# Patient Record
Sex: MALE | Race: WHITE
[De-identification: names, ages, dates, MRNs, and addresses within clinical notes are randomized per-mention and may not be internally consistent; named-entity substitution may affect disease eponyms.]

---

## 2020-12-20 ENCOUNTER — HOSPITAL ENCOUNTER (EMERGENCY)
Dept: HOSPITAL 33 - ED | Age: 50
Discharge: HOME | End: 2020-12-20
Payer: COMMERCIAL

## 2020-12-20 VITALS — SYSTOLIC BLOOD PRESSURE: 170 MMHG | DIASTOLIC BLOOD PRESSURE: 94 MMHG | HEART RATE: 69 BPM | OXYGEN SATURATION: 96 %

## 2020-12-20 DIAGNOSIS — R00.2: Primary | ICD-10-CM

## 2020-12-20 DIAGNOSIS — R06.02: ICD-10-CM

## 2020-12-20 DIAGNOSIS — U07.1: ICD-10-CM

## 2020-12-20 LAB
ALBUMIN SERPL-MCNC: 4.5 G/DL (ref 3.5–5)
ALP SERPL-CCNC: 64 U/L (ref 38–126)
ALT SERPL-CCNC: 23 U/L (ref 0–50)
ANION GAP SERPL CALC-SCNC: 13.7 MEQ/L (ref 5–15)
AST SERPL QL: 44 U/L (ref 17–59)
BASOPHILS # BLD AUTO: 0.03 10*3/UL (ref 0–0.4)
BASOPHILS NFR BLD AUTO: 0.4 % (ref 0–0.4)
BILIRUB BLD-MCNC: 0.6 MG/DL (ref 0.2–1.3)
BNP SERPL-MCNC: 50 PG/ML (ref 0–450)
BUN SERPL-MCNC: 22 MG/DL (ref 9–20)
CALCIUM SPEC-MCNC: 8.9 MG/DL (ref 8.4–10.2)
CHLORIDE SERPL-SCNC: 102 MMOL/L (ref 98–107)
CO2 SERPL-SCNC: 22 MMOL/L (ref 22–30)
CREAT SERPL-MCNC: 1.04 MG/DL (ref 0.66–1.25)
EOSINOPHIL # BLD AUTO: 0.12 10*3/UL (ref 0–0.5)
GFR SERPLBLD BASED ON 1.73 SQ M-ARVRAT: > 60 ML/MIN
GLUCOSE SERPL-MCNC: 134 MG/DL (ref 74–106)
HCT VFR BLD AUTO: 47.7 % (ref 42–50)
HGB BLD-MCNC: 15.9 GM/DL (ref 12.5–18)
LYMPHOCYTES # SPEC AUTO: 2.77 10*3/UL (ref 1–4.6)
MAGNESIUM SERPL-MCNC: 2.1 MG/DL (ref 1.6–2.3)
MCH RBC QN AUTO: 32.1 PG (ref 26–32)
MCHC RBC AUTO-ENTMCNC: 33.3 G/DL (ref 32–36)
MONOCYTES # BLD AUTO: 0.84 10*3/UL (ref 0–1.3)
PLATELET # BLD AUTO: 221 K/MM3 (ref 150–450)
POTASSIUM SERPLBLD-SCNC: 3.7 MMOL/L (ref 3.5–5.1)
PROT SERPL-MCNC: 7.7 G/DL (ref 6.3–8.2)
RBC # BLD AUTO: 4.95 M/MM3 (ref 4.1–5.6)
SODIUM SERPL-SCNC: 135 MMOL/L (ref 137–145)
TROPONIN T SERPL HS-MCNC: < 0.012 NG/ML (ref 0–0.03)
WBC # BLD AUTO: 7.5 K/MM3 (ref 4–10.5)

## 2020-12-20 PROCEDURE — 84484 ASSAY OF TROPONIN QUANT: CPT

## 2020-12-20 PROCEDURE — 96360 HYDRATION IV INFUSION INIT: CPT

## 2020-12-20 PROCEDURE — 71046 X-RAY EXAM CHEST 2 VIEWS: CPT

## 2020-12-20 PROCEDURE — 85025 COMPLETE CBC W/AUTO DIFF WBC: CPT

## 2020-12-20 PROCEDURE — 80053 COMPREHEN METABOLIC PANEL: CPT

## 2020-12-20 PROCEDURE — 85379 FIBRIN DEGRADATION QUANT: CPT

## 2020-12-20 PROCEDURE — 36415 COLL VENOUS BLD VENIPUNCTURE: CPT

## 2020-12-20 PROCEDURE — 83735 ASSAY OF MAGNESIUM: CPT

## 2020-12-20 PROCEDURE — 99284 EMERGENCY DEPT VISIT MOD MDM: CPT

## 2020-12-20 PROCEDURE — 83880 ASSAY OF NATRIURETIC PEPTIDE: CPT

## 2020-12-20 PROCEDURE — 99291 CRITICAL CARE FIRST HOUR: CPT

## 2020-12-20 PROCEDURE — 93041 RHYTHM ECG TRACING: CPT

## 2020-12-20 PROCEDURE — 36000 PLACE NEEDLE IN VEIN: CPT

## 2020-12-20 PROCEDURE — 93005 ELECTROCARDIOGRAM TRACING: CPT

## 2020-12-20 NOTE — ERPHSYRPT
- History of Present Illness


Time Seen by Provider: 12/20/20 09:50


Patient Subjective Stated Complaint: SOB


Triage Nursing Assessment: Patient ambulated back to ED and transferred self to 

bed. Patient A+ O X 3. Patient's skin pink, warm and dry. Patient states around 

0730 he had started to eat breakfast when he came SOB and felt like his pulse 

stopped, but then started again. Patient stated prior he had consumed 2 glasses 

of coffee prior to this incident. Patient currently in no distress. Patient 

denies pain or disomfort. Lungs clear a/p letty.


Physician History: 





10-year-old male came to the emergency room with complaining of sudden onset of 

shortness of breath and palpitation which lasted for few seconds to few minutes.

 At around 7:30 in the morning patient drank 2 cups of coffee and afterwards 

patient started having palpitation followed by sudden missing of the heart beat 

and associated with shortness of breath.  Symptoms lasted for few minutes and 

then went away.  Patient was positive for coronavirus 2019 approximately 4 to 6 

weeks ago.  Patient had a symptoms for 2 weeks but for last 2 weeks patient has 

been feeling better.  Patient did lose sense of smell and had a fever for 1 week

and shortness of breath.


Timing/Duration: today


Severity: moderate


Associated Symptoms: denies symptoms


Allergies/Adverse Reactions: 








acetaminophen [From Vicodin] Allergy (Verified 12/20/20 08:00)


   


aspirin Allergy (Verified 12/20/20 08:00)


   


hydrocodone [From Vicodin] Allergy (Verified 12/20/20 08:00)


   


Penicillins Allergy (Verified 12/20/20 08:00)


   


Sulfa (Sulfonamide Antibiotics) Allergy (Verified 12/20/20 08:00)


   





Hx Tetanus, Diphtheria Vaccination/Date Given: No


Hx Influenza Vaccination/Date Given: No


Hx Pneumococcal Vaccination/Date Given: No


Immunizations Up to Date: Yes





Travel Risk





- International Travel


Have you traveled outside of the country in past 3 weeks: No





- Coronavirus Screening


Are you exhibiting any of the following symptoms?: No





- Review of Systems


Constitutional: No Fever, No Chills


Eyes: No Symptoms


Ears, Nose, & Throat: No Symptoms


Respiratory: No Cough, No Dyspnea


Cardiac: No Chest Pain, No Edema, No Syncope


Abdominal/Gastrointestinal: No Abdominal Pain, No Nausea, No Vomiting, No 

Diarrhea


Genitourinary Symptoms: No Dysuria


Musculoskeletal: No Back Pain, No Neck Pain


Skin: No Rash


Neurological: No Dizziness, No Focal Weakness, No Sensory Changes


Psychological: No Symptoms


Endocrine: No Symptoms


All Other Systems: Reviewed and Negative





- Past Medical History


Pertinent Past Medical History: Yes


Neurological History: No Pertinent History


ENT History: No Pertinent History


Cardiac History: No Pertinent History


Respiratory History: No Pertinent History, Sleep Apnea


Musculoskeletal History: Fractures, Other


GI Medical History: No Pertinent History


 History: No Pertinent History


Psycho-Social History: No Pertinent History


Male Reproductive Disorders: No Pertinent History


Other Medical History: FRACTURED VERB





- Past Surgical History


Past Surgical History: Yes


Other Surgical History: TONSILS





- Social History


Smoking Status: Never smoker


Exposure to second hand smoke: No


Drug Use: none


Patient Lives Alone: No





- Nursing Vital Signs


Nursing Vital Signs: 


                               Initial Vital Signs











Temperature  98.0 F   12/20/20 08:02


 


Pulse Rate  85   12/20/20 08:02


 


Respiratory Rate  17   12/20/20 08:02


 


Blood Pressure  169/98   12/20/20 08:02


 


O2 Sat by Pulse Oximetry  100   12/20/20 08:02








                                   Pain Scale











Pain Intensity                 0

















- Physical Exam


General Appearance: no apparent distress, alert


Eye Exam: PERRL/EOMI, eyes nml inspection


Ears, Nose, Throat Exam: normal ENT inspection, TMs normal, pharynx normal, 

moist mucous membranes


Neck Exam: normal inspection, non-tender, supple, full range of motion


Respiratory Exam: normal breath sounds, lungs clear, No respiratory distress


Cardiovascular Exam: regular rate/rhythm, normal heart sounds, normal peripheral

pulses


Gastrointestinal/Abdomen Exam: soft, normal bowel sounds, No tenderness, No mass


Back Exam: normal inspection, normal range of motion, No CVA tenderness, No 

vertebral tenderness


Extremity Exam: normal inspection, normal range of motion, pelvis stable


Neurologic Exam: alert, oriented x 3, cooperative, normal mood/affect, nml 

cerebellar function, nml station & gait, sensation nml, No motor deficits


Skin Exam: normal color, warm, dry, No rash


Lymphatic Exam: No adenopathy


SpO2: 100





- Course


Nursing assessment & vital signs reviewed: Yes


EKG Interpreted by Me: Sinus Rhythm


Rhythm Strip: Normal Sinus Rhythm





- Radiology Exams


  ** Chest


X-ray Interpretation: Reviewed by me


Ordered Tests: 


                               Active Orders 24 hr











 Category Date Time Status


 


 Cardiac Monitor STAT Care  12/20/20 08:16 Active


 


 EKG-ER Only STAT Care  12/20/20 08:15 Active


 


 CHEST 2 VIEWS (PA AND LAT) Stat Exams  12/20/20 08:15 Taken


 


 CBC W DIFF Stat Lab  12/20/20 08:37 Completed


 


 CMP Stat Lab  12/20/20 08:37 Completed


 


 D-DIMER QUANTITATIVE Stat Lab  12/20/20 08:37 Completed


 


 MAGNESIUM Stat Lab  12/20/20 08:37 Completed


 


 NT PRO BNP Stat Lab  12/20/20 08:37 Completed


 


 TROPONIN Stat Lab  12/20/20 08:37 Completed








Medication Summary














Discontinued Medications














Generic Name Dose Route Start Last Admin





  Trade Name Leola  PRN Reason Stop Dose Admin


 


Sodium Chloride  1,000 mls @ 999 mls/hr  12/20/20 08:15  12/20/20 09:24





  Sodium Chloride 0.9% 1000 Ml  IV  12/20/20 09:15  Infused





  .Q1H1M STA   Infusion


 


Sodium Chloride  Confirm  12/20/20 08:19 





  Sodium Chloride 0.9% 1000 Ml  Administered  12/20/20 08:20 





  Dose  





  1,000 mls @ ud  





  .ROUTE  





  .STK-MED ONE  











Lab/Rad Data: 


                           Laboratory Result Diagrams





                                 12/20/20 08:37 





                                 12/20/20 08:37 





                               Laboratory Results











  12/20/20 12/20/20 12/20/20 Range/Units





  08:37 08:37 08:37 


 


WBC    7.5  (4.0-10.5)  K/mm3


 


RBC    4.95  (4.1-5.6)  M/mm3


 


Hgb    15.9  (12.5-18.0)  gm/dl


 


Hct    47.7  (42-50)  %


 


MCV    96.4  ()  fl


 


MCH    32.1 H  (26-32)  pg


 


MCHC    33.3  (32-36)  g/dl


 


RDW    12.9  (11.5-14.0)  %


 


Plt Count    221  (150-450)  K/mm3


 


MPV    9.9  (7.5-11.0)  fl


 


Gran %    49.5  (36.0-66.0)  %


 


Eos # (Auto)    0.12  (0-0.5)  


 


Absolute Lymphs (auto)    2.77  (1.0-4.6)  


 


Absolute Monos (auto)    0.84  (0.0-1.3)  


 


Lymphocytes %    37.2  (24.0-44.0)  %


 


Monocytes %    11.3  (0.0-12.0)  %


 


Eosinophils %    1.6  (0.00-5.0)  %


 


Basophils %    0.4  (0.0-0.4)  %


 


Absolute Granulocytes    3.69  (1.4-6.9)  


 


Basophils #    0.03  (0-0.4)  


 


D-Dimer  258    (215-500)  ng/mL


 


Sodium   135 L   (137-145)  mmol/L


 


Potassium   3.7   (3.5-5.1)  mmol/L


 


Chloride   102   ()  mmol/L


 


Carbon Dioxide   22   (22-30)  mmol/L


 


Anion Gap   13.7   (5-15)  MEQ/L


 


BUN   22 H   (9-20)  mg/dL


 


Creatinine   1.04   (0.66-1.25)  mg/dL


 


Estimated GFR   > 60.0   ML/MIN


 


Glucose   134 H   ()  mg/dL


 


Calcium   8.9   (8.4-10.2)  mg/dL


 


Magnesium   2.1   (1.6-2.3)  mg/dL


 


Total Bilirubin   0.60   (0.2-1.3)  mg/dL


 


AST   44   (17-59)  U/L


 


ALT   23   (0-50)  U/L


 


Alkaline Phosphatase   64   ()  U/L


 


Troponin I   < 0.012   (0.000-0.034)  ng/mL


 


NT-Pro-B Natriuret Pep   50.0   (0-450)  pg/mL


 


Serum Total Protein   7.7   (6.3-8.2)  g/dL


 


Albumin   4.5   (3.5-5.0)  g/dL














- Progress


Progress: improved


Counseled pt/family regarding: lab results, diagnosis, need for follow-up, rad 

results





- Departure


Departure Disposition: Home


Clinical Impression: 


 Palpitations, Shortness of breath, COVID-19 virus detected





Condition: Stable


Critical Care Time: Yes


Critical Care Time(excluding separately billable procedures): Critical 30-74 

mins


Instructions:  Palpitations (DC)


Additional Instructions: 


Discharge/Care Plan





CANALESCHASEVANDANA CAMERON was seen on 12/20/20 in the Emergency Room. The patient was 

counseled regarding Diagnosis,Lab results, Imaging studies, need for follow up 

and when to return to the Emergency Room.





Prescriptions given:





Discharge Note





I have spoken with the patient and/or caregivers. I have explained the patient's

condition, diagnosis and treatment plan based on the information available to me

at this time. I have answered the patient's and/or caregiver's questions and 

addressed any concerns. The patient and/or caregivers have as good understanding

of the patient's diagnosis, condition and treatment plan as can be expected at 

this point. The vital signs have been stable. The patient's condition is stable 

and appropriate for discharge from the emergency department.





The patient will pursue further outpatient evaluation with the primary care 

physician or other designated or consulting physician as outlined in the 

discharge instructions. The patient and/or caregivers are agreeable to this plan

of care and follow-up instructions have been explained in detail. The patient 

and/or caregivers have received these instruction. The patient/and or caregivers

are aware that any significant change in condition or worsening of symptoms 

should prompt an immediate return to this or the closest emergency department or

call 911. 





MARCELLE CANALES was seen on 12/20/20 n the Emergency Room. At that time you 

were treated for an emergent condition, during your visit Laboratory, Radiology 

and/or other procedures may have been ordered. It is very important that you 

follow-up with your Primary Care Physician JOVAN LOPEZ within the next 24-48 

hours to review your Emergency Room visit and the final results of testing that 

was ordered.  Some test results such as Urine Cultures, Blood Cultures, and 

other cultures if ordered will not be finalized for 24-48 hours.





If you do not have a Primary Care Provider please call the medical records 

department at [phone] to obtain a copy of your results or you may 

sign into our patient portal to obtain these results by visiting us @ 

[email or web address] and completing the following steps:





1. Click on the Patient Portal link





2. Click the Patient Self Enrollment Link to complete the enrollment form and en

tering your Medical Record Number [id number]





3. Once the enrollment form is completed you will receive an email with a 

temporary ID and password at the email address you provided. 





4. Next choose a user name and password. Your user name must be at least 4 

characters long and your password must be at least 4 characters long.





5. Choose a security question from the list and provide your answer to the 

question.





If you already have signed into the Health Portal you may access your Health 

Care Information  24/7 by the following steps:





1. Login to  our website @ [email or web address]





2. Enter your original user name and password.





FAQS





The [facility] Portal is an online tool that contains your Lab Results, 

Radiology Reports, Visit History, Discharge Instructions and Health Summary 





Lab and Radiology Results will not be available for 72 hours on the portal.





The Portal is a secure site, passwords are encryted and URLs are re-written so 

they cannot be copied and pasted. You and authorized family members are the only

ones who can access your Portal. Also there is a timeout feature that protects 

your information if you leave the Portal page open.





If you have technical difficulty please use the Contact Us link on the page this

will allow you to submit any questions you have regarding the Portal or you may 

contact the Medical Record Department at [phone].


Prescriptions: 


Metoprolol Succinate 25 mg Xl* [Toprol-Xl 25MG Tablets***] 25 mg PO DAILY 30 Da

ys #30 tab

## 2020-12-20 NOTE — XRAY
Indication: Short of breath.  Palpitations.



Comparison: 2/21/10.



PA/lateral chest again demonstrates normal heart, lungs, and bony thorax.